# Patient Record
Sex: MALE | Race: WHITE | Employment: UNEMPLOYED | URBAN - METROPOLITAN AREA
[De-identification: names, ages, dates, MRNs, and addresses within clinical notes are randomized per-mention and may not be internally consistent; named-entity substitution may affect disease eponyms.]

---

## 2023-08-07 ENCOUNTER — TELEPHONE (OUTPATIENT)
Dept: FAMILY MEDICINE CLINIC | Age: 28
End: 2023-08-07

## 2023-08-07 NOTE — TELEPHONE ENCOUNTER
PT mother called she has guardianship forms that need to be completed and she needs to get them to the court by the end of week. She would like to know if she can drop them off to be filled out. Please let mother know if this would be okay.

## 2023-08-11 ENCOUNTER — TELEPHONE (OUTPATIENT)
Dept: FAMILY MEDICINE CLINIC | Age: 28
End: 2023-08-11

## 2023-08-11 NOTE — TELEPHONE ENCOUNTER
Dianna West from [facility] and there was statement of expert evaluation and there wasn't a current medication list attached. They need that medication list faxed.             Fax Number: [phone]      Attention to Dianna West

## 2023-08-14 RX ORDER — FLUTICASONE PROPIONATE 50 MCG
SPRAY, SUSPENSION (ML) NASAL
COMMUNITY
Start: 2023-07-19 | End: 2023-08-18

## 2023-08-14 RX ORDER — ONDANSETRON 8 MG/1
TABLET, ORALLY DISINTEGRATING ORAL
COMMUNITY
Start: 2023-07-02

## 2023-08-14 RX ORDER — CLONIDINE HYDROCHLORIDE 0.1 MG/1
TABLET ORAL
COMMUNITY
Start: 2023-07-19

## 2023-08-14 RX ORDER — DILTIAZEM HYDROCHLORIDE 180 MG/1
CAPSULE, COATED, EXTENDED RELEASE ORAL
COMMUNITY
Start: 2023-07-19

## 2023-08-14 RX ORDER — ESOMEPRAZOLE MAGNESIUM 40 MG/1
CAPSULE, DELAYED RELEASE ORAL
COMMUNITY
Start: 2023-07-19

## 2023-08-14 RX ORDER — BUSPIRONE HYDROCHLORIDE 15 MG/1
TABLET ORAL
COMMUNITY
Start: 2023-07-19

## 2023-08-14 RX ORDER — MIRTAZAPINE 15 MG/1
TABLET, ORALLY DISINTEGRATING ORAL
COMMUNITY
Start: 2023-07-19

## 2023-08-14 RX ORDER — LEVETIRACETAM 500 MG/1
TABLET ORAL
COMMUNITY
Start: 2023-07-19

## 2023-08-14 RX ORDER — FAMOTIDINE 40 MG/1
TABLET, FILM COATED ORAL
COMMUNITY
Start: 2023-07-19

## 2023-08-14 RX ORDER — RISPERIDONE 3 MG/1
TABLET ORAL
COMMUNITY
Start: 2023-07-19

## 2023-08-14 RX ORDER — MONTELUKAST SODIUM 10 MG/1
TABLET ORAL
COMMUNITY
Start: 2023-07-19

## 2023-08-18 RX ORDER — MELATONIN 2.5 MG
TABLET,CHEWABLE ORAL
Qty: 30 EACH | Refills: 3 | Status: SHIPPED | OUTPATIENT
Start: 2023-08-18

## 2023-08-18 RX ORDER — FLUTICASONE PROPIONATE 50 MCG
SPRAY, SUSPENSION (ML) NASAL
Qty: 16 G | Refills: 3 | Status: SHIPPED | OUTPATIENT
Start: 2023-08-18

## 2023-08-18 RX ORDER — DIPHENHYDRAMINE HCL 25 MG/1
TABLET ORAL
Qty: 60 TABLET | Refills: 3 | Status: SHIPPED | OUTPATIENT
Start: 2023-08-18

## 2023-08-21 RX ORDER — MELATONIN 2.5 MG
10 TABLET,CHEWABLE ORAL NIGHTLY
Qty: 120 EACH | Refills: 11 | Status: SHIPPED | OUTPATIENT
Start: 2023-08-21

## 2023-10-25 ENCOUNTER — OFFICE VISIT (OUTPATIENT)
Dept: FAMILY MEDICINE CLINIC | Age: 28
End: 2023-10-25
Payer: MEDICAID

## 2023-10-25 VITALS
WEIGHT: 255 LBS | SYSTOLIC BLOOD PRESSURE: 132 MMHG | HEIGHT: 76 IN | DIASTOLIC BLOOD PRESSURE: 74 MMHG | HEART RATE: 105 BPM | TEMPERATURE: 97.8 F | OXYGEN SATURATION: 96 % | BODY MASS INDEX: 31.05 KG/M2

## 2023-10-25 DIAGNOSIS — R00.0 TACHYCARDIA: Primary | ICD-10-CM

## 2023-10-25 DIAGNOSIS — F84.0 AUTISM: ICD-10-CM

## 2023-10-25 DIAGNOSIS — J40 BRONCHITIS: ICD-10-CM

## 2023-10-25 DIAGNOSIS — F41.1 GENERALIZED ANXIETY DISORDER: ICD-10-CM

## 2023-10-25 DIAGNOSIS — F42.8 OTHER OBSESSIVE-COMPULSIVE DISORDERS: ICD-10-CM

## 2023-10-25 DIAGNOSIS — F98.9 BEHAVIORAL AND EMOTIONAL DISORDER WITH ONSET IN CHILDHOOD: ICD-10-CM

## 2023-10-25 DIAGNOSIS — K21.9 GASTROESOPHAGEAL REFLUX DISEASE WITHOUT ESOPHAGITIS: ICD-10-CM

## 2023-10-25 PROBLEM — G40.909 SEIZURE DISORDER (HCC): Status: ACTIVE | Noted: 2023-10-25

## 2023-10-25 PROCEDURE — 99214 OFFICE O/P EST MOD 30 MIN: CPT | Performed by: FAMILY MEDICINE

## 2023-10-25 RX ORDER — CHOLECALCIFEROL (VITAMIN D3) 25 MCG
2 TABLET,CHEWABLE ORAL DAILY
COMMUNITY

## 2023-10-25 RX ORDER — PHENOL 1.4 %
10 AEROSOL, SPRAY (ML) MUCOUS MEMBRANE NIGHTLY
COMMUNITY

## 2023-10-25 RX ORDER — AMOXICILLIN 400 MG/5ML
875 POWDER, FOR SUSPENSION ORAL 2 TIMES DAILY
Qty: 218 ML | Refills: 0 | Status: SHIPPED | OUTPATIENT
Start: 2023-10-25 | End: 2023-11-04

## 2023-10-25 RX ORDER — DEXTROMETHORPHAN HYDROBROMIDE, GUAIFENESIN 5; 100 MG/5ML; MG/5ML
20 LIQUID ORAL DAILY PRN
COMMUNITY

## 2023-10-25 RX ORDER — LOPERAMIDE HYDROCHLORIDE 2 MG/1
2 CAPSULE ORAL 4 TIMES DAILY PRN
COMMUNITY

## 2023-10-25 RX ORDER — LACTASE 9000 UNIT
TABLET,CHEWABLE ORAL
COMMUNITY
Start: 2021-08-18

## 2023-10-25 RX ORDER — LORAZEPAM 1 MG/1
1 TABLET ORAL DAILY PRN
COMMUNITY
Start: 2023-03-21

## 2023-10-25 SDOH — ECONOMIC STABILITY: FOOD INSECURITY: WITHIN THE PAST 12 MONTHS, THE FOOD YOU BOUGHT JUST DIDN'T LAST AND YOU DIDN'T HAVE MONEY TO GET MORE.: NEVER TRUE

## 2023-10-25 SDOH — ECONOMIC STABILITY: INCOME INSECURITY: HOW HARD IS IT FOR YOU TO PAY FOR THE VERY BASICS LIKE FOOD, HOUSING, MEDICAL CARE, AND HEATING?: NOT HARD AT ALL

## 2023-10-25 SDOH — ECONOMIC STABILITY: FOOD INSECURITY: WITHIN THE PAST 12 MONTHS, YOU WORRIED THAT YOUR FOOD WOULD RUN OUT BEFORE YOU GOT MONEY TO BUY MORE.: NEVER TRUE

## 2023-10-25 SDOH — ECONOMIC STABILITY: HOUSING INSECURITY
IN THE LAST 12 MONTHS, WAS THERE A TIME WHEN YOU DID NOT HAVE A STEADY PLACE TO SLEEP OR SLEPT IN A SHELTER (INCLUDING NOW)?: NO

## 2023-10-25 ASSESSMENT — PATIENT HEALTH QUESTIONNAIRE - PHQ9: DEPRESSION UNABLE TO ASSESS: FUNCTIONAL CAPACITY MOTIVATION LIMITS ACCURACY

## 2023-10-25 NOTE — PROGRESS NOTES
Subjective  Fausto Schmitz 1995 is a 33 y.o. male who presents today with:  Chief Complaint   Patient presents with    Annual Exam     Keisha Oropeza is a 33year old male who presents for a 6 month follow up. He is accompanied in the office by his mother. Mom reports that he has had a cough the past few days. Denies fever or sinus congestion. His aunt was recently diagnosed with bronchitis and bilateral ear infection. HPI  I have reviewed HPI and agree with above. Review of Systems   All other systems reviewed and are negative. History reviewed. No pertinent past medical history. History reviewed. No pertinent surgical history. Social History     Socioeconomic History    Marital status: Single     Spouse name: Not on file    Number of children: Not on file    Years of education: Not on file    Highest education level: Not on file   Occupational History    Not on file   Tobacco Use    Smoking status: Never    Smokeless tobacco: Never   Substance and Sexual Activity    Alcohol use: Never    Drug use: Never    Sexual activity: Not on file   Other Topics Concern    Not on file   Social History Narrative    Not on file     Social Determinants of Health     Financial Resource Strain: Low Risk  (10/25/2023)    Overall Financial Resource Strain (CARDIA)     Difficulty of Paying Living Expenses: Not hard at all   Food Insecurity: No Food Insecurity (10/25/2023)    Hunger Vital Sign     Worried About Running Out of Food in the Last Year: Never true     [address] in the Last Year: Never true   Transportation Needs: Unknown (10/25/2023)    PRAPARE - Transportation     Lack of Transportation (Medical): Not on file     Lack of Transportation (Non-Medical):  No   Physical Activity: Not on file   Stress: Not on file   Social Connections: Not on file   Intimate Partner Violence: Not on file   Housing Stability: Unknown (10/25/2023)    Housing Stability Vital Sign     Unable to Pay for Housing in the Last Year: Note Text (......Xxx Chief Complaint.): This diagnosis correlates with the cysts on scalp Detail Level: Simple Other (Free Text): LN2 to lentigo on left upper abdomen

## 2023-11-20 RX ORDER — BUSPIRONE HYDROCHLORIDE 15 MG/1
15 TABLET ORAL 3 TIMES DAILY
Qty: 90 TABLET | Refills: 2 | Status: SHIPPED | OUTPATIENT
Start: 2023-11-20

## 2023-11-20 RX ORDER — LEVETIRACETAM 500 MG/1
500 TABLET ORAL 2 TIMES DAILY
Qty: 60 TABLET | Refills: 0 | Status: SHIPPED | OUTPATIENT
Start: 2023-11-20

## 2023-11-20 RX ORDER — MIRTAZAPINE 15 MG/1
15 TABLET, ORALLY DISINTEGRATING ORAL NIGHTLY
Qty: 30 TABLET | Refills: 0 | Status: SHIPPED | OUTPATIENT
Start: 2023-11-20

## 2023-11-20 RX ORDER — MONTELUKAST SODIUM 10 MG/1
10 TABLET ORAL DAILY
Qty: 30 TABLET | Refills: 0 | Status: SHIPPED | OUTPATIENT
Start: 2023-11-20

## 2023-11-20 RX ORDER — ESOMEPRAZOLE MAGNESIUM 40 MG/1
40 CAPSULE, DELAYED RELEASE ORAL
Qty: 30 CAPSULE | Refills: 0 | Status: SHIPPED | OUTPATIENT
Start: 2023-11-20

## 2023-11-20 RX ORDER — RISPERIDONE 3 MG/1
3 TABLET ORAL 2 TIMES DAILY
Qty: 60 TABLET | Refills: 0 | Status: SHIPPED | OUTPATIENT
Start: 2023-11-20

## 2023-11-20 RX ORDER — CLONIDINE HYDROCHLORIDE 0.1 MG/1
0.1 TABLET ORAL 2 TIMES DAILY
Qty: 60 TABLET | Refills: 0 | Status: SHIPPED | OUTPATIENT
Start: 2023-11-20

## 2023-12-04 ENCOUNTER — TELEPHONE (OUTPATIENT)
Dept: FAMILY MEDICINE CLINIC | Age: 28
End: 2023-12-04

## 2023-12-04 NOTE — TELEPHONE ENCOUNTER
Patients mom Precious/ guardian calling about sons Liver enzymes says they are elevated     asking for call back to discuss [phone]

## 2023-12-07 DIAGNOSIS — R74.8 ELEVATED LIVER ENZYMES: Primary | ICD-10-CM

## 2023-12-07 DIAGNOSIS — R74.8 ELEVATED ALKALINE PHOSPHATASE LEVEL: ICD-10-CM

## 2023-12-11 ENCOUNTER — HOSPITAL ENCOUNTER (OUTPATIENT)
Age: 28
Discharge: HOME | End: 2023-12-11
Payer: MEDICAID

## 2023-12-11 DIAGNOSIS — R00.0 TACHYCARDIA: ICD-10-CM

## 2023-12-11 DIAGNOSIS — R74.8 ELEVATED ALKALINE PHOSPHATASE LEVEL: ICD-10-CM

## 2023-12-11 LAB
ALP SERPL-CCNC: 144 U/L (ref 40–129)
CRP SERPL HS-MCNC: 6.7 MG/L (ref 0–5)
GGT SERPL-CCNC: 27 U/L (ref 8–61)
TSH SERPL DL<=0.05 MIU/L-ACNC: 2 UIU/ML (ref 0.3–5)

## 2023-12-11 PROCEDURE — 86140 C-REACTIVE PROTEIN: CPT

## 2023-12-11 PROCEDURE — 82977 ASSAY OF GGT: CPT

## 2023-12-11 PROCEDURE — 84443 ASSAY THYROID STIM HORMONE: CPT

## 2023-12-11 PROCEDURE — 84075 ASSAY ALKALINE PHOSPHATASE: CPT

## 2023-12-11 PROCEDURE — 36415 COLL VENOUS BLD VENIPUNCTURE: CPT

## 2023-12-12 RX ORDER — FLUTICASONE PROPIONATE 50 MCG
SPRAY, SUSPENSION (ML) NASAL
Qty: 16 G | Refills: 3 | Status: SHIPPED | OUTPATIENT
Start: 2023-12-12

## 2023-12-12 NOTE — TELEPHONE ENCOUNTER
Future Appointments    Encounter Information   Provider Department Appt Notes   10/24/2024 DO [address] [facility] 1 yr f/u     Past Visits    Date Provider Specialty Visit Type Primary Dx   10/25/2023 Aysha Saenz DO Family Medicine Office Visit Tachycardia

## 2023-12-26 ENCOUNTER — HOSPITAL ENCOUNTER (OUTPATIENT)
Age: 28
Discharge: HOME | End: 2023-12-26
Payer: MEDICAID

## 2023-12-26 DIAGNOSIS — R79.82 ELEVATED C-REACTIVE PROTEIN (CRP): ICD-10-CM

## 2023-12-26 DIAGNOSIS — R74.8 ELEVATED ALKALINE PHOSPHATASE LEVEL: ICD-10-CM

## 2023-12-26 LAB
ALP SERPL-CCNC: 144 U/L (ref 40–129)
BASOPHILS # BLD: 0.02 K/UL (ref 0–0.2)
BASOPHILS NFR BLD: 0 % (ref 0–2)
CRP SERPL HS-MCNC: 5.2 MG/L (ref 0–5)
EOSINOPHIL # BLD: 0.06 K/UL (ref 0–0.4)
EOSINOPHILS RELATIVE PERCENT: 1 % (ref 0–5)
ERYTHROCYTE [DISTWIDTH] IN BLOOD BY AUTOMATED COUNT: 12.5 % (ref 12.1–15.2)
HCT VFR BLD AUTO: 43.8 % (ref 41–53)
HGB BLD-MCNC: 14.7 G/DL (ref 13.5–17.5)
IMM GRANULOCYTES # BLD AUTO: 0.01 K/UL (ref 0–0.3)
IMM GRANULOCYTES NFR BLD: 0 % (ref 0–5)
LYMPHOCYTES NFR BLD: 1.8 K/UL (ref 1–4.8)
LYMPHOCYTES RELATIVE PERCENT: 27 % (ref 13–44)
MCH RBC QN AUTO: 27.3 PG (ref 26–34)
MCHC RBC AUTO-ENTMCNC: 33.6 G/DL (ref 31–37)
MCV RBC AUTO: 81.4 FL (ref 80–100)
MONOCYTES NFR BLD: 0.58 K/UL (ref 0–1)
MONOCYTES NFR BLD: 9 % (ref 5–9)
NEUTROPHILS NFR BLD: 63 % (ref 39–75)
NEUTS SEG NFR BLD: 4.21 K/UL (ref 2.1–6.5)
PLATELET # BLD AUTO: 241 K/UL (ref 140–450)
PMV BLD AUTO: 9.9 FL (ref 6–12)
RBC # BLD AUTO: 5.38 M/UL (ref 4.5–5.9)
WBC OTHER # BLD: 6.7 K/UL (ref 3.5–11)

## 2023-12-26 PROCEDURE — 84075 ASSAY ALKALINE PHOSPHATASE: CPT

## 2023-12-26 PROCEDURE — 86140 C-REACTIVE PROTEIN: CPT

## 2023-12-26 PROCEDURE — 36415 COLL VENOUS BLD VENIPUNCTURE: CPT

## 2023-12-26 PROCEDURE — 85025 COMPLETE CBC W/AUTO DIFF WBC: CPT

## 2023-12-26 PROCEDURE — 84080 ASSAY ALKALINE PHOSPHATASES: CPT

## 2023-12-28 LAB
ALK PHOS BONE SPECIFIC: 53 U/L (ref 0–55)
ALK PHOS OTHER CALC: 0 U/L
ALK PHOSPHATASE: 144 U/L (ref 40–120)
ALKALINE PHOSPHATASE LIVER FRACTION: 91 U/L (ref 0–94)

## 2024-01-04 ENCOUNTER — TELEPHONE (OUTPATIENT)
Dept: FAMILY MEDICINE CLINIC | Age: 29
End: 2024-01-04

## 2024-01-04 DIAGNOSIS — R74.8 ELEVATED ALKALINE PHOSPHATASE LEVEL: Primary | ICD-10-CM

## 2024-01-04 NOTE — TELEPHONE ENCOUNTER
Patients mom Precious calling in asking for U/S    Mom says she spoke too soon      son is not better Heart Rate 122 Blood pressure 158/102    Covid test-negative    Call when order is [phone]

## 2024-01-15 RX ORDER — ONDANSETRON 8 MG/1
8 TABLET, ORALLY DISINTEGRATING ORAL EVERY 8 HOURS PRN
Qty: 30 TABLET | Refills: 0 | Status: SHIPPED | OUTPATIENT
Start: 2024-01-15

## 2024-01-15 NOTE — TELEPHONE ENCOUNTER
Patient requesting medication refill. Please approve or deny this request.    Rx requested:  Requested Prescriptions     Pending Prescriptions Disp Refills    ondansetron (ZOFRAN-ODT) 8 MG TBDP disintegrating tablet       Sig: Place 1 tablet under the tongue every 8 hours as needed for Nausea or Vomiting         Last Office Visit:   10/25/2023      Next Visit Date:  Future Appointments   Date Time Provider Department Center   10/24/2024  1:00 PM Jay Helton DO [facility]

## 2024-02-25 NOTE — TELEPHONE ENCOUNTER
Pharmacy requesting medication refill. Please approve or deny this request.    Rx requested:  Requested Prescriptions     Pending Prescriptions Disp Refills    ondansetron (ZOFRAN-ODT) 8 MG TBDP disintegrating tablet [Pharmacy Med Name: ONDANSETRON ODT 8 MG TABLET] 30 tablet 0     Sig: dissolve 1 tablet under the tongue every 8 hours if needed for nausea and vomiting         Last Office Visit:   Visit date not found      Next Visit Date:  Future Appointments   Date Time Provider Department Center   10/24/2024  1:00 PM Jay Helton DO [facility]

## 2024-02-26 RX ORDER — ONDANSETRON 8 MG/1
TABLET, ORALLY DISINTEGRATING ORAL
Qty: 30 TABLET | Refills: 0 | Status: SHIPPED | OUTPATIENT
Start: 2024-02-26

## 2024-03-26 RX ORDER — ONDANSETRON 8 MG/1
TABLET, ORALLY DISINTEGRATING ORAL
Qty: 30 TABLET | Refills: 2 | Status: SHIPPED | OUTPATIENT
Start: 2024-03-26

## 2024-05-29 ENCOUNTER — PATIENT MESSAGE (OUTPATIENT)
Dept: FAMILY MEDICINE CLINIC | Age: 29
End: 2024-05-29

## 2024-05-29 DIAGNOSIS — K21.9 GASTROESOPHAGEAL REFLUX DISEASE WITHOUT ESOPHAGITIS: Primary | ICD-10-CM

## 2024-05-29 RX ORDER — ESOMEPRAZOLE MAGNESIUM 40 MG/1
40 CAPSULE, DELAYED RELEASE ORAL 2 TIMES DAILY
Qty: 180 CAPSULE | Refills: 0 | Status: SHIPPED | OUTPATIENT
Start: 2024-05-29

## 2024-05-29 NOTE — TELEPHONE ENCOUNTER
From: Isidro Josue  To: Dr. Jay PECK Danie  Sent: 5/29/2024 2:36 PM EDT  Subject: Nexin, can um refill    I have tried 3 times to get his Nexium refilled through Dr. Russell's office and they have not called in Isidro's Nexium. I would like a referral to a gastrointestinal doctor through you. But until then can Isidro get a refill from you. He takes 40mg capsules 1 @ 7am and 1 @ 4pm. Appreciate you!    Thanks,   Jud SANCHES

## 2024-07-08 RX ORDER — MONTELUKAST SODIUM 10 MG/1
10 TABLET ORAL NIGHTLY
Qty: 90 TABLET | Refills: 1 | Status: SHIPPED | OUTPATIENT
Start: 2024-07-08

## 2024-07-22 RX ORDER — ONDANSETRON 8 MG/1
TABLET, ORALLY DISINTEGRATING ORAL
Qty: 30 TABLET | Refills: 2 | Status: SHIPPED | OUTPATIENT
Start: 2024-07-22 | End: 2024-07-22 | Stop reason: SDUPTHER

## 2024-07-22 RX ORDER — ONDANSETRON 8 MG/1
TABLET, ORALLY DISINTEGRATING ORAL
Qty: 30 TABLET | Refills: 2 | Status: SHIPPED | OUTPATIENT
Start: 2024-07-22

## 2024-08-20 RX ORDER — FAMOTIDINE 40 MG/1
40 TABLET, FILM COATED ORAL DAILY
Qty: 90 TABLET | Refills: 3 | Status: SHIPPED | OUTPATIENT
Start: 2024-08-20

## 2024-10-07 DIAGNOSIS — K21.9 GASTROESOPHAGEAL REFLUX DISEASE WITHOUT ESOPHAGITIS: ICD-10-CM

## 2024-10-07 RX ORDER — ESOMEPRAZOLE MAGNESIUM 40 MG/1
40 CAPSULE, DELAYED RELEASE ORAL 2 TIMES DAILY
Qty: 180 CAPSULE | Refills: 3 | Status: SHIPPED | OUTPATIENT
Start: 2024-10-07

## 2024-10-24 ENCOUNTER — OFFICE VISIT (OUTPATIENT)
Dept: FAMILY MEDICINE CLINIC | Age: 29
End: 2024-10-24
Payer: MEDICAID

## 2024-10-24 VITALS
OXYGEN SATURATION: 96 % | BODY MASS INDEX: 28.62 KG/M2 | SYSTOLIC BLOOD PRESSURE: 132 MMHG | WEIGHT: 235 LBS | DIASTOLIC BLOOD PRESSURE: 80 MMHG | HEIGHT: 76 IN | TEMPERATURE: 97.8 F | HEART RATE: 102 BPM

## 2024-10-24 DIAGNOSIS — F98.9 BEHAVIORAL AND EMOTIONAL DISORDER WITH ONSET IN CHILDHOOD: ICD-10-CM

## 2024-10-24 DIAGNOSIS — F84.0 AUTISM: ICD-10-CM

## 2024-10-24 DIAGNOSIS — R00.0 TACHYCARDIA: Primary | ICD-10-CM

## 2024-10-24 DIAGNOSIS — F41.1 GENERALIZED ANXIETY DISORDER: ICD-10-CM

## 2024-10-24 DIAGNOSIS — R74.8 ELEVATED ALKALINE PHOSPHATASE LEVEL: ICD-10-CM

## 2024-10-24 DIAGNOSIS — E78.2 MIXED HYPERLIPIDEMIA: ICD-10-CM

## 2024-10-24 DIAGNOSIS — K21.9 GASTROESOPHAGEAL REFLUX DISEASE WITHOUT ESOPHAGITIS: ICD-10-CM

## 2024-10-24 PROCEDURE — G8484 FLU IMMUNIZE NO ADMIN: HCPCS | Performed by: FAMILY MEDICINE

## 2024-10-24 PROCEDURE — 99214 OFFICE O/P EST MOD 30 MIN: CPT | Performed by: FAMILY MEDICINE

## 2024-10-24 PROCEDURE — G8417 CALC BMI ABV UP PARAM F/U: HCPCS | Performed by: FAMILY MEDICINE

## 2024-10-24 PROCEDURE — G8427 DOCREV CUR MEDS BY ELIG CLIN: HCPCS | Performed by: FAMILY MEDICINE

## 2024-10-24 PROCEDURE — 1036F TOBACCO NON-USER: CPT | Performed by: FAMILY MEDICINE

## 2024-10-24 ASSESSMENT — PATIENT HEALTH QUESTIONNAIRE - PHQ9
1. LITTLE INTEREST OR PLEASURE IN DOING THINGS: NOT AT ALL
SUM OF ALL RESPONSES TO PHQ QUESTIONS 1-9: 0
2. FEELING DOWN, DEPRESSED OR HOPELESS: NOT AT ALL
SUM OF ALL RESPONSES TO PHQ QUESTIONS 1-9: 0
SUM OF ALL RESPONSES TO PHQ QUESTIONS 1-9: 0
2. FEELING DOWN, DEPRESSED OR HOPELESS: NOT AT ALL
SUM OF ALL RESPONSES TO PHQ9 QUESTIONS 1 & 2: 0
SUM OF ALL RESPONSES TO PHQ QUESTIONS 1-9: 0
SUM OF ALL RESPONSES TO PHQ9 QUESTIONS 1 & 2: 0
1. LITTLE INTEREST OR PLEASURE IN DOING THINGS: NOT AT ALL
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
## 2024-10-31 ENCOUNTER — TELEPHONE (OUTPATIENT)
Dept: FAMILY MEDICINE CLINIC | Age: 29
End: 2024-10-31

## 2024-10-31 NOTE — TELEPHONE ENCOUNTER
Americo from [facility] calling in   The expert evaluation form from 10/24/24 is missing the medication list. This is on page 2, #4    Patient had appointment on this day - I do not see form in media  Americo is faxing it over    *Can call with medication list or fax  Fax [phone]    Americo phone [phone]

## 2024-11-26 ENCOUNTER — PATIENT MESSAGE (OUTPATIENT)
Dept: FAMILY MEDICINE CLINIC | Age: 29
End: 2024-11-26
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
## 2024-12-03 ENCOUNTER — OFFICE VISIT (OUTPATIENT)
Dept: FAMILY MEDICINE CLINIC | Age: 29
End: 2024-12-03
Payer: COMMERCIAL

## 2024-12-03 VITALS
TEMPERATURE: 97.7 F | DIASTOLIC BLOOD PRESSURE: 92 MMHG | SYSTOLIC BLOOD PRESSURE: 132 MMHG | OXYGEN SATURATION: 96 % | HEART RATE: 94 BPM

## 2024-12-03 DIAGNOSIS — G40.909 SEIZURE DISORDER (HCC): Primary | ICD-10-CM

## 2024-12-03 DIAGNOSIS — B37.9 YEAST INFECTION: ICD-10-CM

## 2024-12-03 PROCEDURE — G8427 DOCREV CUR MEDS BY ELIG CLIN: HCPCS | Performed by: FAMILY MEDICINE

## 2024-12-03 PROCEDURE — G8484 FLU IMMUNIZE NO ADMIN: HCPCS | Performed by: FAMILY MEDICINE

## 2024-12-03 PROCEDURE — 1036F TOBACCO NON-USER: CPT | Performed by: FAMILY MEDICINE

## 2024-12-03 PROCEDURE — 99214 OFFICE O/P EST MOD 30 MIN: CPT | Performed by: FAMILY MEDICINE

## 2024-12-03 PROCEDURE — G8417 CALC BMI ABV UP PARAM F/U: HCPCS | Performed by: FAMILY MEDICINE

## 2024-12-03 RX ORDER — FLUOXETINE 20 MG/1
TABLET, FILM COATED ORAL
COMMUNITY
Start: 2024-11-14

## 2024-12-03 RX ORDER — LEVETIRACETAM 500 MG/1
500 TABLET, FILM COATED ORAL
COMMUNITY
Start: 2024-11-26

## 2024-12-03 RX ORDER — LORAZEPAM 0.5 MG/1
TABLET ORAL
COMMUNITY
Start: 2024-11-05

## 2024-12-03 RX ORDER — DIAZEPAM 10 MG/2G
10 GEL RECTAL DAILY PRN
Qty: 1 EACH | Refills: 3 | Status: SHIPPED | OUTPATIENT
Start: 2024-12-03 | End: 2025-01-03

## 2024-12-03 RX ORDER — RISPERIDONE 2 MG/1
2 TABLET ORAL DAILY
COMMUNITY
Start: 2024-11-03 | End: 2024-12-03

## 2024-12-03 RX ORDER — NYSTATIN 100000 U/G
CREAM TOPICAL
Qty: 30 G | Refills: 2 | Status: SHIPPED | OUTPATIENT
Start: 2024-12-03

## 2024-12-03 SDOH — ECONOMIC STABILITY: FOOD INSECURITY: WITHIN THE PAST 12 MONTHS, YOU WORRIED THAT YOUR FOOD WOULD RUN OUT BEFORE YOU GOT MONEY TO BUY MORE.: NEVER TRUE

## 2024-12-03 SDOH — ECONOMIC STABILITY: FOOD INSECURITY: WITHIN THE PAST 12 MONTHS, THE FOOD YOU BOUGHT JUST DIDN'T LAST AND YOU DIDN'T HAVE MONEY TO GET MORE.: NEVER TRUE

## 2024-12-03 SDOH — ECONOMIC STABILITY: INCOME INSECURITY: HOW HARD IS IT FOR YOU TO PAY FOR THE VERY BASICS LIKE FOOD, HOUSING, MEDICAL CARE, AND HEATING?: NOT HARD AT ALL

## 2024-12-03 NOTE — PROGRESS NOTES
Isidro Josue (:  1995) is a 29 y.o. male, Established patient, here for evaluation of the following chief complaint(s):  ER Follow Up (He was evaluated and treated at [facility] on 24 following a seizure. He is accompanied in the office today by his mother who reports that he hit his head during the seizure. He does have some left sided facial bruising. He was started on Kepra and has not had any seizures since . Requesting referral to [facility] neurology. )          Subjective   History of Present Illness  The patient presents for evaluation of multiple medical concerns. He is accompanied by an adult female.    Approx one week ago, he experienced a seizure during which he fell and hit the left side of his face on a table, resulting in bleeding from his mouth. The seizure lasted for 2 minutes, followed by a period of 5 to 7 minutes where he was unresponsive. He was taken off his seizure medication, Keppra 250 mg, for 2 weeks and 3 days prior to the seizure. He has been taking Ativan every morning as prescribed by Dr. Edge. His oxygen saturation dropped below 88 during the seizure, and his heart rate was recorded at 122 this morning, although his blood pressure remained normal. A CT scan of his head and neck was performed, which showed no abnormalities.    He has been experiencing jock itch, which has improved in appearance but continues to cause itching. He has been using a spray for treatment.    Past Medical History:   Diagnosis Date    Anxiety     Seizures (HCC)      History reviewed. No pertinent surgical history.  Social History     Socioeconomic History    Marital status: Single     Spouse name: Not on file    Number of children: Not on file    Years of education: Not on file    Highest education level: Not on file   Occupational History    Not on file   Tobacco Use    Smoking status: Never    Smokeless tobacco: Never   Vaping Use    Vaping status: Former   Substance and Sexual Activity

## 2025-04-15 RX ORDER — FAMOTIDINE 40 MG/1
40 TABLET, FILM COATED ORAL DAILY
Qty: 90 TABLET | Refills: 3 | Status: SHIPPED | OUTPATIENT
Start: 2025-04-15

## 2025-06-16 ENCOUNTER — OFFICE VISIT (OUTPATIENT)
Age: 30
End: 2025-06-16
Payer: COMMERCIAL

## 2025-06-16 VITALS
WEIGHT: 226.6 LBS | SYSTOLIC BLOOD PRESSURE: 122 MMHG | BODY MASS INDEX: 27.58 KG/M2 | DIASTOLIC BLOOD PRESSURE: 80 MMHG | HEART RATE: 127 BPM

## 2025-06-16 DIAGNOSIS — G40.909 SEIZURE DISORDER (HCC): Primary | ICD-10-CM

## 2025-06-16 DIAGNOSIS — F84.0 ACTIVE AUTISTIC DISORDER: ICD-10-CM

## 2025-06-16 DIAGNOSIS — F41.1 GENERALIZED ANXIETY DISORDER: ICD-10-CM

## 2025-06-16 PROCEDURE — G8427 DOCREV CUR MEDS BY ELIG CLIN: HCPCS | Performed by: PSYCHIATRY & NEUROLOGY

## 2025-06-16 PROCEDURE — G8417 CALC BMI ABV UP PARAM F/U: HCPCS | Performed by: PSYCHIATRY & NEUROLOGY

## 2025-06-16 PROCEDURE — 99204 OFFICE O/P NEW MOD 45 MIN: CPT | Performed by: PSYCHIATRY & NEUROLOGY

## 2025-06-16 PROCEDURE — 1036F TOBACCO NON-USER: CPT | Performed by: PSYCHIATRY & NEUROLOGY

## 2025-06-16 RX ORDER — LEVETIRACETAM 100 MG/ML
SOLUTION ORAL
Qty: 300 ML | Refills: 3 | Status: SHIPPED | OUTPATIENT
Start: 2025-06-16

## 2025-06-16 SDOH — ECONOMIC STABILITY: FOOD INSECURITY: WITHIN THE PAST 12 MONTHS, YOU WORRIED THAT YOUR FOOD WOULD RUN OUT BEFORE YOU GOT MONEY TO BUY MORE.: NEVER TRUE

## 2025-06-16 SDOH — ECONOMIC STABILITY: TRANSPORTATION INSECURITY
IN THE PAST 12 MONTHS, HAS THE LACK OF TRANSPORTATION KEPT YOU FROM MEDICAL APPOINTMENTS OR FROM GETTING MEDICATIONS?: NO

## 2025-06-16 SDOH — ECONOMIC STABILITY: TRANSPORTATION INSECURITY
IN THE PAST 12 MONTHS, HAS LACK OF TRANSPORTATION KEPT YOU FROM MEETINGS, WORK, OR FROM GETTING THINGS NEEDED FOR DAILY LIVING?: NO

## 2025-06-16 SDOH — ECONOMIC STABILITY: INCOME INSECURITY: IN THE LAST 12 MONTHS, WAS THERE A TIME WHEN YOU WERE NOT ABLE TO PAY THE MORTGAGE OR RENT ON TIME?: NO

## 2025-06-16 SDOH — ECONOMIC STABILITY: FOOD INSECURITY: WITHIN THE PAST 12 MONTHS, THE FOOD YOU BOUGHT JUST DIDN'T LAST AND YOU DIDN'T HAVE MONEY TO GET MORE.: NEVER TRUE

## 2025-06-16 ASSESSMENT — PATIENT HEALTH QUESTIONNAIRE - PHQ9
SUM OF ALL RESPONSES TO PHQ9 QUESTIONS 1 & 2: 2
2. FEELING DOWN, DEPRESSED OR HOPELESS: SEVERAL DAYS
2. FEELING DOWN, DEPRESSED OR HOPELESS: SEVERAL DAYS
SUM OF ALL RESPONSES TO PHQ QUESTIONS 1-9: 2
1. LITTLE INTEREST OR PLEASURE IN DOING THINGS: SEVERAL DAYS
SUM OF ALL RESPONSES TO PHQ QUESTIONS 1-9: 2
1. LITTLE INTEREST OR PLEASURE IN DOING THINGS: SEVERAL DAYS

## 2025-06-16 NOTE — PROGRESS NOTES
Subjective:      Patient ID: Isidro Josue is a 29 y.o. male who presents today for:  Chief Complaint   Patient presents with    New Patient     In November pt had a seizure. Pt is nonverbal and they were trying to get him off of keprra in 2021 he was really sick and he had a grand mal seizure. And the neurologist at [facility] states  he doesn't have a seizure disorder he was on 750 to 500 to 250 and he had a seizure. He was seizing for 2 min and had that stare for 8 mins. His pulse rate is high and mom states this is one of the signs he might be getting sick. He also has a tremor he had for a while.     Other     Pt is on keppra 500 mom has to crush the pill up because he doesn't swallow pills and how does that affect the time release of the pills since he is not swallowing the pill        HPI 29-year-old right gentleman now with a history of seizure disorder.  Patient is on Keppra 500 mg daily.  Patient has seizure and was seen in the emergency room as he was off his medication for 2 weeks and 3 days.  Seizure lasted about 2 minutes and he was in a locked-in state for about 7 minutes.  He was given Diastat.  The seizure was in November..  Patient is being tried to get of Keppra and he was really sick and had a grand mal seizures.  His neurologist is at [facility] and reports he does not have a seizure disorder as he had a seizure on 750 mg..  Patient does not swallow pill therefore Keppra is crushed which he takes.    Past Medical History:   Diagnosis Date    Anxiety     Seizures (HCC)      No past surgical history on file.  Social History     Socioeconomic History    Marital status: Single     Spouse name: Not on file    Number of children: Not on file    Years of education: Not on file    Highest education level: Not on file   Occupational History    Not on file   Tobacco Use    Smoking status: Never    Smokeless tobacco: Never   Vaping Use    Vaping status: Former   Substance and Sexual Activity    Alcohol

## 2025-06-18 ENCOUNTER — OFFICE VISIT (OUTPATIENT)
Age: 30
End: 2025-06-18
Payer: COMMERCIAL

## 2025-06-18 VITALS
HEIGHT: 76 IN | DIASTOLIC BLOOD PRESSURE: 74 MMHG | SYSTOLIC BLOOD PRESSURE: 122 MMHG | HEART RATE: 82 BPM | OXYGEN SATURATION: 98 % | WEIGHT: 233 LBS | TEMPERATURE: 97.6 F | BODY MASS INDEX: 28.37 KG/M2

## 2025-06-18 DIAGNOSIS — F41.1 GENERALIZED ANXIETY DISORDER: ICD-10-CM

## 2025-06-18 DIAGNOSIS — F84.0 AUTISM: ICD-10-CM

## 2025-06-18 DIAGNOSIS — G40.909 SEIZURE DISORDER (HCC): ICD-10-CM

## 2025-06-18 DIAGNOSIS — R00.0 TACHYCARDIA: Primary | ICD-10-CM

## 2025-06-18 PROCEDURE — G8427 DOCREV CUR MEDS BY ELIG CLIN: HCPCS | Performed by: FAMILY MEDICINE

## 2025-06-18 PROCEDURE — G8417 CALC BMI ABV UP PARAM F/U: HCPCS | Performed by: FAMILY MEDICINE

## 2025-06-18 PROCEDURE — 99214 OFFICE O/P EST MOD 30 MIN: CPT | Performed by: FAMILY MEDICINE

## 2025-06-18 PROCEDURE — 1036F TOBACCO NON-USER: CPT | Performed by: FAMILY MEDICINE

## 2025-06-18 RX ORDER — PROPRANOLOL HCL 20 MG
20 TABLET ORAL 3 TIMES DAILY
Qty: 90 TABLET | Refills: 3 | Status: SHIPPED | OUTPATIENT
Start: 2025-06-18

## 2025-06-18 RX ORDER — FLUTICASONE PROPIONATE 50 MCG
1 SPRAY, SUSPENSION (ML) NASAL DAILY PRN
Qty: 16 G | Refills: 3 | Status: SHIPPED | OUTPATIENT
Start: 2025-06-18

## 2025-06-18 NOTE — PROGRESS NOTES
Isidro Josue (:  1995) is a 29 y.o. male, Established patient, here for evaluation of the following chief complaint(s):  Follow-up (6 month follow up. ), Seizures (He was seen by neurology last week and diagnosed with epilepsy. His Keppra was changed from a pill to a liquid. He has not had any seizures since 2024. She does have some questions in regards to what symptoms to look for if he is having focal or less severe seizures. ), and Anxiety (He has a history of taccycardia which is believed to be due to anxiety. He takes Lorazepam (from Kely Higuera CNP) currently, but mom is questioning if this is the right medication for him. )          Subjective   History of Present Illness  The patient presents for evaluation of epilepsy, tachycardia, and anxiety.    He was recently diagnosed with epilepsy by Dr. Mcgraw, following a history of 3 known seizures, including 2 grand mal episodes. There have been no recent grand mal seizures, but there is uncertainty about the occurrence of petit mal or absence seizures. His Keppra medication is being transitioned from a pill to a liquid form, although the rationale for this change is unclear. There is difficulty in understanding Dr. Mcgraw due to his accent.    His heart rate has been consistently elevated at 140 beats per minute, even during periods of rest. This is reminiscent of a similar episode 3 years ago when he was ill. The current tachycardia may be related to anxiety. His heart rate was previously well-controlled in the 60s and 70s but has recently increased to the 90s and upper 100s, with readings as high as 150 over the past few weeks, regardless of his activity level. The heart rate medication was previously discontinued when his heart rate normalized. Increasing physical activity through walking is being encouraged.    An alternative anxiety medication for daily use is being sought, as frequent use of Ativan may lead to tolerance. He is currently

## 2025-06-30 RX ORDER — MONTELUKAST SODIUM 10 MG/1
10 TABLET ORAL NIGHTLY
Qty: 90 TABLET | Refills: 1 | Status: SHIPPED | OUTPATIENT
Start: 2025-06-30

## 2025-08-07 RX ORDER — RISPERIDONE 3 MG/1
3 TABLET ORAL 2 TIMES DAILY
Qty: 60 TABLET | Refills: 0 | Status: SHIPPED | OUTPATIENT
Start: 2025-08-07